# Patient Record
Sex: FEMALE | Race: BLACK OR AFRICAN AMERICAN | NOT HISPANIC OR LATINO | URBAN - METROPOLITAN AREA
[De-identification: names, ages, dates, MRNs, and addresses within clinical notes are randomized per-mention and may not be internally consistent; named-entity substitution may affect disease eponyms.]

---

## 2020-06-17 ENCOUNTER — OFFICE VISIT (OUTPATIENT)
Dept: URBAN - METROPOLITAN AREA CLINIC 86 | Facility: CLINIC | Age: 40
End: 2020-06-17

## 2020-06-17 NOTE — HPI-TODAY'S VISIT:
The patient is a 39 year old /Black female, who presents on referral from Maxx Del Toro MD, [facility] a gastroenterology evaluation for elevated liver enzymes. A copy of this document will be sent to the referring provider.       Pt here for follow up for abnormal lfts. her herbals have been stopped about 2+ years ago.  s  she uses bleach products weekly with cleaning and laundry and recommend she avoid this and see if this helps her labs.   she did a liver bx on 10/22/19.  will plan for u.s now. her lfts improved prior to the liver bx.  her bx was nonspecific.   will try fozia 300mg bid to see if this helps her labs.  she saw dr. del toro and is on pred 20mg daily. she will prob start benlysta once approved and stop plaquenil and pred.  liver tox: Hepatotoxicity In publications on the large scale trials of belimumab, elevations in serum aminotransferase levels were uncommon and no more frequent during belimumab therapy than in placebo treated controls. In the prelicensure studies there were no reports of clinically apparent liver injury attributed to belimumab and, since its approval and more wide scale use, there have been no case reports of liver injury with jaundice linked to belimumab therapy. Reactivation of hepatitis B can occur with use of immunosuppressive monoclonal antibodies such as infliximab and adalimumab, but instances have not been reported with belimumab. However, belimumab does result in a decrease in circulating B cells and immunoglobulin levels and thus might be a cause of reactivation of HBV replication in a susceptible patient. Nevertheless, severe liver injury has not been reported with use of belimumab.  Likelihood score: E (unlikely cause of clinically apparent liver injury).    10/22/19 labs wbc 4 hgb 10.8 low plt 246 inr 1.0 alt 54 ast 95 alp 52  liver bx 10/22/19:  [facility]  Patient Name: TERRY TAVARES Acc #: [id number] MRN: [id number] : 1980 (Age: 39)  GI Biopsy 1 Location: Radiology Inpatient [facility] Gender: F Collected: 10/22/2019 Client: [facility] Received: 10/22/2019 Submitting Phys: Jose Corado MD Reported: 10/29/2019   Surgical Pathology Report   Pathologic Diagnosis LIVER, NATIVE, TRANSJUGULAR BIOPSY: MINIMAL NON-SPECIFIC FINDINGS INCLUDING OCCASIONAL FRAGMENTED PORTAL VEIN BRANCHES AND RARE PROLIFERATING BILE DUCTULES. NO SIGNIFICANT FIBROSIS. SEE COMMENT.  Comment The biopsy consists of several short cores of liver containing portions of eight portal tracts for evaluation, not all of which are very well represented. Portal tracts contain minimal mononuclear inflammation. There are rare proliferating bile ductules. An occasional portal vein branch appears fragmented with herniation into the surrounding parenchyma while others appear unremarkable. The parenchyma contains no more than minimal inflammation. There is minimal steatosis (less than 1%). There are rare ceroid laden macrophages. Multiple additional H&E stained levels are examined. A trichrome stain shows no significant fibrosis. A PASD stain illustrates bile ducts and shows no PASD positive globules within hepatocytes. An iron stain detects and no significant iron deposition.  Overall, the findings are minimal and non-specific. The occasional fragmented portal vein branches and rare proliferating bile ductules could possibly be related to either a vascular flow abnormality or biliary process, however these findings are quite subtle in this case and may not necessarily be clinically significant. There are no findings to suggest an active hepatitic process.  This case was discussed with Dr. Glynn Kurtz by Dr. Jena Paul Sierra on 10/29/2019.    bqx Electronically Signed by Jean Paul Sierra MD Location: [facility]  10/29/2019 16:38    labs 19 ast 140 alt 74 igg 1620 iga 256 igm 381 ama neg  labs 19 alp 59 ast 106 alt 68 igg elevated 1618 igm 401 iga 281 alpha 1 MM Hep c ab neg need hep b vaccine ama <20, ceruloplasmin 26.3 ferritin 22 hep b surf ab neg hep b core ab neg need hep a vaccine iron sat 16%  19 u/s: wnl, patent vessels  Dr Del Toro is managing her Lupus and Sjogrens. Prior was seeing another doctor and Dr Mcdonough at [place].  They had started on steroids and the plaquenil.  Has been diagnsoed and on tx since   No prior ever mentioned labs off until now.   \Wt about 230 and not changing.   She says that her weight gain has been slow and gradual and not worse  Dr Del Toro sent records:  Stressed to pt the need for social distancing and strict handwashing and wearing a mask and to follow any other new or added CDC recommendations as this is an evolving target.   Duration of visit 25 mins with over 50% of the time explaining pt's condition and treatment plan

## 2020-06-25 ENCOUNTER — OFFICE VISIT (OUTPATIENT)
Dept: URBAN - METROPOLITAN AREA CLINIC 86 | Facility: CLINIC | Age: 40
End: 2020-06-25

## 2020-07-26 ENCOUNTER — TELEPHONE ENCOUNTER (OUTPATIENT)
Dept: URBAN - METROPOLITAN AREA CLINIC 92 | Facility: CLINIC | Age: 40
End: 2020-07-26

## 2020-07-26 NOTE — HPI-TODAY'S VISIT:
The patient is a 39 year old /Black female, who presents on referral from Maxx Del Toro MD,  after last visit on 2020 for a gastroenterology evaluation for elevated liver enzymes. A copy of this document will be sent to the referring provider.       Pt here for follow up for abnormal lfts. her herbals have been stopped about 2+ years ago.  s  she uses bleach products weekly with cleaning and laundry and recommend she avoid this and see if this helps her labs.   she did a liver bx on 10/22/19.  will plan for u.s now. her lfts improved prior to the liver bx.  her bx was nonspecific.   will try fozia 300mg bid to see if this helps her labs.  she saw dr. del toro and is on pred 20mg daily. she will prob start benlysta once approved and stop plaquenil and pred.  liver tox: Hepatotoxicity In publications on the large scale trials of belimumab, elevations in serum aminotransferase levels were uncommon and no more frequent during belimumab therapy than in placebo treated controls. In the prelicensure studies there were no reports of clinically apparent liver injury attributed to belimumab and, since its approval and more wide scale use, there have been no case reports of liver injury with jaundice linked to belimumab therapy. Reactivation of hepatitis B can occur with use of immunosuppressive monoclonal antibodies such as infliximab and adalimumab, but instances have not been reported with belimumab. However, belimumab does result in a decrease in circulating B cells and immunoglobulin levels and thus might be a cause of reactivation of HBV replication in a susceptible patient. Nevertheless, severe liver injury has not been reported with use of belimumab.  Likelihood score: E (unlikely cause of clinically apparent liver injury).    10/22/19 labs wbc 4 hgb 10.8 low plt 246 inr 1.0 alt 54 ast 95 alp 52  liver bx 10/22/19:  [facility]  Patient Name: TERRY TAVARES Acc #: [id number] MRN: [id number] : 1980 (Age: 39)  GI Biopsy 1 Location: Radiology Inpatient [facility] Gender: F Collected: 10/22/2019 Client: [facility] Received: 10/22/2019 Submitting Phys: Jose Corado MD Reported: 10/29/2019   Surgical Pathology Report   Pathologic Diagnosis LIVER, NATIVE, TRANSJUGULAR BIOPSY: MINIMAL NON-SPECIFIC FINDINGS INCLUDING OCCASIONAL FRAGMENTED PORTAL VEIN BRANCHES AND RARE PROLIFERATING BILE DUCTULES. NO SIGNIFICANT FIBROSIS. SEE COMMENT.  Comment The biopsy consists of several short cores of liver containing portions of eight portal tracts for evaluation, not all of which are very well represented. Portal tracts contain minimal mononuclear inflammation. There are rare proliferating bile ductules. An occasional portal vein branch appears fragmented with herniation into the surrounding parenchyma while others appear unremarkable. The parenchyma contains no more than minimal inflammation. There is minimal steatosis (less than 1%). There are rare ceroid laden macrophages. Multiple additional H&E stained levels are examined. A trichrome stain shows no significant fibrosis. A PASD stain illustrates bile ducts and shows no PASD positive globules within hepatocytes. An iron stain detects and no significant iron deposition.  Overall, the findings are minimal and non-specific. The occasional fragmented portal vein branches and rare proliferating bile ductules could possibly be related to either a vascular flow abnormality or biliary process, however these findings are quite subtle in this case and may not necessarily be clinically significant. There are no findings to suggest an active hepatitic process.  This case was discussed with Dr. Glynn Kurtz by Dr. Jean Paul Sierra on 10/29/2019.    bqx Electronically Signed by Jean Paul Sierra MD Location: [facility]  10/29/2019 16:38    labs 19 ast 140 alt 74 igg 1620 iga 256 igm 381 ama neg  labs 19 alp 59 ast 106 alt 68 igg elevated 1618 igm 401 iga 281 alpha 1 MM Hep c ab neg need hep b vaccine ama <20, ceruloplasmin 26.3 ferritin 22 hep b surf ab neg hep b core ab neg need hep a vaccine iron sat 16%  19 u/s: wnl, patent vessels  Dr Del Toro is managing her Lupus and Sjogrens. Prior was seeing another doctor and Dr Mcdonough at [place].  They had started on steroids and the plaquenil.  Has been diagnsoed and on tx since   No prior ever mentioned labs off until now.   \Wt about 230 and not changing.   She says that her weight gain has been slow and gradual and not worse  Dr Del Toro sent records:     Duration of visit 25 mins with over 50% of the time explaining pt's condition and treatment plan

## 2020-07-30 ENCOUNTER — OFFICE VISIT (OUTPATIENT)
Dept: URBAN - METROPOLITAN AREA CLINIC 91 | Facility: CLINIC | Age: 40
End: 2020-07-30
Payer: COMMERCIAL

## 2020-07-30 DIAGNOSIS — R74.8 ABNORMAL LIVER ENZYMES: ICD-10-CM

## 2020-07-30 PROCEDURE — 93975 VASCULAR STUDY: CPT

## 2020-07-30 PROCEDURE — 76705 ECHO EXAM OF ABDOMEN: CPT

## 2020-07-31 ENCOUNTER — OFFICE VISIT (OUTPATIENT)
Dept: URBAN - METROPOLITAN AREA TELEHEALTH 2 | Facility: TELEHEALTH | Age: 40
End: 2020-07-31
Payer: COMMERCIAL

## 2020-07-31 DIAGNOSIS — E66.9 OBESITY: ICD-10-CM

## 2020-07-31 DIAGNOSIS — Z71.89 VACCINE COUNSELING: ICD-10-CM

## 2020-07-31 DIAGNOSIS — R74.8 ABNORMAL LIVER ENZYMES: ICD-10-CM

## 2020-07-31 DIAGNOSIS — E66.8 OTHER OBESITY: ICD-10-CM

## 2020-07-31 DIAGNOSIS — Z79.899 HIGH RISK MEDICATION USE: ICD-10-CM

## 2020-07-31 DIAGNOSIS — G70.00 MYASTHENIA: ICD-10-CM

## 2020-07-31 DIAGNOSIS — G43.909 MIGRAINES: ICD-10-CM

## 2020-07-31 DIAGNOSIS — M32.9 LUPUS: ICD-10-CM

## 2020-07-31 DIAGNOSIS — M35.00 SJOGREN'S DISEASE: ICD-10-CM

## 2020-07-31 DIAGNOSIS — R76.8 ANA POSITIVE: ICD-10-CM

## 2020-07-31 PROBLEM — 91637004: Status: ACTIVE | Noted: 2020-07-31

## 2020-07-31 PROCEDURE — 99214 OFFICE O/P EST MOD 30 MIN: CPT

## 2020-07-31 PROCEDURE — G8427 DOCREV CUR MEDS BY ELIG CLIN: HCPCS

## 2020-07-31 PROCEDURE — 1036F TOBACCO NON-USER: CPT

## 2020-07-31 PROCEDURE — G8417 CALC BMI ABV UP PARAM F/U: HCPCS

## 2020-07-31 PROCEDURE — G9903 PT SCRN TBCO ID AS NON USER: HCPCS

## 2020-07-31 RX ORDER — TOPIRAMATE 25 MG/1
1 TABLET TABLET, COATED ORAL QID
Status: ACTIVE | COMMUNITY

## 2020-07-31 RX ORDER — HYDROCHLOROTHIAZIDE 12.5 MG/1
1 CAPSULE IN THE MORNING CAPSULE ORAL ONCE A DAY
Status: ACTIVE | COMMUNITY

## 2020-07-31 RX ORDER — PYRIDOSTIGMINE BROMIDE 60 MG/1
2 TABLETS TABLET ORAL TID
Status: ACTIVE | COMMUNITY

## 2020-07-31 NOTE — HPI-TODAY'S VISIT:
The patient is a 40 year old /Black female, who presents on referral from Maxx Del Toro MD,  after last visit on 2020 for a gastroenterology evaluation for elevated liver enzymes.   A copy of this document will be sent to the referring provider.   RECAP: Pt here for follow up for abnormal lfts. prior she has antonette her herbals have been stopped about 2+ years ago.  Prior she used bleach products weekly with cleaning and for her laundry and recommend she avoid this and see if this helps her labs.   She did a liver bx on 10/22/19.  Her lfts improved prior to the liver bx.  Her bx was nonspecific.   We did trial of ursodiol 300mg bid to see if this helps her labs and she is still on it.  Whe  she saw Dr. Del Toro and she was on  pred 20mg daily but since weaned to 10mg a day and she has not started the benlysta. Still on the plaquenil and pred.   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 For this she is on meds for this 60mg a day. She says that the myasthenia is better and still seeing people for this.  Belimumam liver tox: Hepatotoxicity In publications on the large scale trials of belimumab, elevations in serum aminotransferase levels were uncommon and no more frequent during belimumab therapy than in placebo treated controls. In the prelicensure studies there were no reports of clinically apparent liver injury attributed to belimumab and, since its approval and more wide scale use, there have been no case reports of liver injury with jaundice linked to belimumab therapy. Reactivation of hepatitis B can occur with use of immunosuppressive monoclonal antibodies such as infliximab and adalimumab, but instances have not been reported with belimumab. However, belimumab does result in a decrease in circulating B cells and immunoglobulin levels and thus might be a cause of reactivation of HBV replication in a susceptible patient. Nevertheless, severe liver injury has not been reported with use of belimumab.  Likelihood score: E (unlikely cause of clinically apparent liver injury).  320 alb 4.1 and alk 52 and ast 139 and alt 63 and tb 0.5 and bun 15 ca 9.1  co2 20 and cl 104 and cr 0.82 k 4.0 and na 141. hct 31.8 and hg 10.8 and plat 282 and wbc 2.8.   She says labs were redone in  by Dr del toro and we will call to get them. She cannot recall but she thinks that was myasthenia flared up in march and she think that they are better now.  2020 alb 4.3 and alk 55 and ast 48 and alt 28 and tb 0.5 ca 9.4 cl 103 and cr 0.91 and  glu 113 and  na 140.  10/22/19 labs wbc 4 hgb 10.8 low plt 246 inr 1.0 alt 54 ast 95 alp 52  liver bx 10/22/19:  [facility]  Patient Name: TERRY TAVARES Acc #: [id number] MRN: [id number] : 1980 (Age: 39)  GI Biopsy 1 Location: Radiology Inpatient [facility] Gender: F Collected: 10/22/2019 Client: [facility] Received: 10/22/2019 Submitting Phys: Jose Corado MD Reported: 10/29/2019   Surgical Pathology Report   Pathologic Diagnosis LIVER, NATIVE, TRANSJUGULAR BIOPSY: MINIMAL NON-SPECIFIC FINDINGS INCLUDING OCCASIONAL FRAGMENTED PORTAL VEIN BRANCHES AND RARE PROLIFERATING BILE DUCTULES. NO SIGNIFICANT FIBROSIS. SEE COMMENT.  Comment The biopsy consists of several short cores of liver containing portions of eight portal tracts for evaluation, not all of which are very well represented. Portal tracts contain minimal mononuclear inflammation. There are rare proliferating bile ductules. An occasional portal vein branch appears fragmented with herniation into the surrounding parenchyma while others appear unremarkable. The parenchyma contains no more than minimal inflammation. There is minimal steatosis (less than 1%). There are rare ceroid laden macrophages. Multiple additional H&E stained levels are examined. A trichrome stain shows no significant fibrosis. A PASD stain illustrates bile ducts and shows no PASD positive globules within hepatocytes. An iron stain detects and no significant iron deposition.  Overall, the findings are minimal and non-specific. The occasional fragmented portal vein branches and rare proliferating bile ductules could possibly be related to either a vascular flow abnormality or biliary process, however these findings are quite subtle in this case and may not necessarily be clinically significant. There are no findings to suggest an active hepatitic process.  This case was discussed with Dr. Glynn Kurtz by Dr. Jean Paul Sierra on 10/29/2019.    bqx Electronically Signed by Jean Paul Sierra MD Location: [facility]  10/29/2019 16:38    19 ast 140 alt 74 igg 1620 iga 256 igm 381 ama neg  labs 19 alp 59 ast 106 alt 68 igg elevated 1618 igm 401 iga 281 alpha 1 MM Hep c ab neg need hep b vaccine ama <20, ceruloplasmin 26.3 ferritin 22 hep b surf ab neg and needed at some point hep b core ab neg good to note need hep a vaccine at some point  iron sat 16%  She did the u.s yesterday and we need to check on that.  She did in am.   19 u/s: wnl, patent vessels  Dr Del Toro is managing her Lupus and Sjogrens. Prior was seeing another doctor and Dr Mcdonough at [place].  She has been diagnosed and on tx since   No prior ever mentioned labs off until    Wt about 230 and she says is about the same and not changing.   She says that her weight gain has been slow and gradual and not worse  Plan: 1. get the u.s that was done yestrday. 2. We are calling for Dr Del Toro's labs. 3. She sees Dr Del Toro in Aug. 4. Plan to see in Sept and see how labs are. 5. I am hoping that are labs better and makes sense if immune flare that the lfts went up with up.  Stressed to pt the need for social distancing and strict handwashing and wearing a mask and to follow any other new or added CDC recommendations as this is an evolving target.   Duration of visit 25 mins with over 50% of the time explaining pt's condition and treatment plan with the pt.  She started out with video but  that stopped working on the audio due to internet issues and so finished on the phone.  More than half of the face-to-face time used for counseling and coordination of care while that worked.  Then it failed.   Attempts were made to use real-time two-way video technology for today's encounter. Due to a technological failure or access issues, telephone technology was used in lieu of an office visit due to the current COVID-19 pandemic crisis and need for social isolation.   Patient seen today via telehealth by agreement and consent of patient in light of current COVID-19 pandemic. I used video conferencing during the visit. The patient encounter is appropriate and reasonable under the circumstances given the patient's particular presentation at this time. The patient has been advised of the followin) the potential risks and limitations of this mode of treatment (including but not limited to the absence of in-person examination); 2) the right to refuse telehealth services at any point without affecting the right to future care; 3) the right to receive in-person services, included immediately after this consultation if an urgent need arises; 4) information, including identifiable images or information from this telehealth consult, will only be shared in accordance with HIPAA regulations. Any and all of the patient's and/or patient's family member's questions on this issue have been answered. The patient has verbally consented to be treated via telehealth services. The patient has also been advised to contact this office for worsening conditions or problems, and seek emergency medical treatment and/or call 911 if the patient deems either necessary.

## 2020-08-03 ENCOUNTER — TELEPHONE ENCOUNTER (OUTPATIENT)
Dept: URBAN - METROPOLITAN AREA CLINIC 92 | Facility: CLINIC | Age: 40
End: 2020-08-03

## 2020-08-03 NOTE — HPI-TODAY'S VISIT:
Deasantana Roa, U.s: Doppler vessels patent. No abdominal abnormality seen by them. Spleen 8.5cm and no focal lesions. Right kidney 10.6cm and no hydronephrosis.  Imaged pancreas unremarkable. Common duct 3mm. No gallstones.   Thank you.  Dr Kurtz

## 2020-08-07 ENCOUNTER — LAB OUTSIDE AN ENCOUNTER (OUTPATIENT)
Dept: URBAN - METROPOLITAN AREA CLINIC 98 | Facility: CLINIC | Age: 40
End: 2020-08-07

## 2020-08-10 ENCOUNTER — TELEPHONE ENCOUNTER (OUTPATIENT)
Dept: URBAN - METROPOLITAN AREA CLINIC 92 | Facility: CLINIC | Age: 40
End: 2020-08-10

## 2020-08-10 LAB
ALBUMIN: 4
ALKALINE PHOSPHATASE: 53
ALT (SGPT): 29
AST (SGOT): 43
BILIRUBIN, DIRECT: 0.14
BILIRUBIN, TOTAL: 0.6
PROTEIN, TOTAL: 7

## 2020-08-10 NOTE — HPI-OTHER HISTORIES
alb 4.0 and tb 0.6 and alk 53 and ast 43 and alt 29.  March 2020 labs ast 139 and alt 63 so much better and not at ideal alt <25 yet.

## 2020-09-01 ENCOUNTER — LAB OUTSIDE AN ENCOUNTER (OUTPATIENT)
Dept: URBAN - METROPOLITAN AREA TELEHEALTH 2 | Facility: TELEHEALTH | Age: 40
End: 2020-09-01

## 2020-09-23 ENCOUNTER — DASHBOARD ENCOUNTERS (OUTPATIENT)
Age: 40
End: 2020-09-23

## 2020-09-24 ENCOUNTER — OFFICE VISIT (OUTPATIENT)
Dept: URBAN - METROPOLITAN AREA TELEHEALTH 2 | Facility: TELEHEALTH | Age: 40
End: 2020-09-24

## 2020-09-24 RX ORDER — TOPIRAMATE 25 MG/1
1 TABLET TABLET, COATED ORAL QID
Status: ACTIVE | COMMUNITY

## 2020-09-24 RX ORDER — HYDROCHLOROTHIAZIDE 12.5 MG/1
1 CAPSULE IN THE MORNING CAPSULE ORAL ONCE A DAY
Status: ACTIVE | COMMUNITY

## 2020-09-24 RX ORDER — PYRIDOSTIGMINE BROMIDE 60 MG/1
2 TABLETS TABLET ORAL TID
Status: ACTIVE | COMMUNITY